# Patient Record
Sex: MALE | Race: WHITE | NOT HISPANIC OR LATINO | Employment: UNEMPLOYED | URBAN - NONMETROPOLITAN AREA
[De-identification: names, ages, dates, MRNs, and addresses within clinical notes are randomized per-mention and may not be internally consistent; named-entity substitution may affect disease eponyms.]

---

## 2017-04-14 ENCOUNTER — HOSPITAL ENCOUNTER (EMERGENCY)
Facility: HOSPITAL | Age: 57
Discharge: HOME | End: 2017-04-14
Attending: FAMILY MEDICINE | Admitting: FAMILY MEDICINE

## 2017-04-14 VITALS
HEIGHT: 72 IN | OXYGEN SATURATION: 95 % | HEART RATE: 57 BPM | SYSTOLIC BLOOD PRESSURE: 136 MMHG | DIASTOLIC BLOOD PRESSURE: 93 MMHG | TEMPERATURE: 98.2 F | WEIGHT: 220 LBS | RESPIRATION RATE: 18 BRPM | BODY MASS INDEX: 29.8 KG/M2

## 2017-04-14 DIAGNOSIS — S71.111A: Primary | ICD-10-CM

## 2017-04-14 PROCEDURE — 99283 EMERGENCY DEPT VISIT LOW MDM: CPT

## 2017-04-14 RX ORDER — CLOPIDOGREL BISULFATE 75 MG/1
40 TABLET ORAL DAILY
COMMUNITY

## 2017-04-14 NOTE — ED PROVIDER NOTES
Subjective   Patient is a 56 y.o. male presenting with skin laceration.   History provided by:  Patient   used: No    Laceration   Location:  Leg  Leg laceration location:  R upper leg  Time since incident:  30 minutes  Laceration mechanism:  Knife  Pain details:     Quality:  Sharp and burning    Severity:  Severe    Timing:  Constant    Progression:  Worsening  Relieved by:  Nothing  Worsened by:  Nothing  Ineffective treatments:  None tried  Tetanus status: within the last 2 years.  Associated symptoms: no fever, no focal weakness, no numbness, no rash, no redness and no swelling    Patient states 30 minutes ago he was cutting a tie and accidentally cut his right thigh.  He applied pressure and dressed the wound and came to the ED.  Pain is 7/10 and worsening currently.      Review of Systems   Constitutional: Negative for fever.   Skin: Negative for rash.   Neurological: Negative for focal weakness.       No past medical history on file.    Allergies   Allergen Reactions   • Codeine    • Statins        No past surgical history on file.    No family history on file.    Social History     Social History   • Marital status:      Spouse name: N/A   • Number of children: N/A   • Years of education: N/A     Social History Main Topics   • Smoking status: Not on file   • Smokeless tobacco: Not on file   • Alcohol use Not on file   • Drug use: Not on file   • Sexual activity: Not on file     Other Topics Concern   • Not on file     Social History Narrative           Objective   Physical Exam   Constitutional: He is oriented to person, place, and time. He appears well-developed and well-nourished. No distress.   Cardiovascular: Normal rate, regular rhythm, normal heart sounds and intact distal pulses.  Exam reveals no gallop and no friction rub.    No murmur heard.  Pulmonary/Chest: Effort normal and breath sounds normal.   Musculoskeletal:        Legs:  Neurological: He is alert and oriented  to person, place, and time.   Skin: He is not diaphoretic.       Laceration Repair  Date/Time: 4/15/2017 5:00 PM  Performed by: ANA CURRY  Authorized by: GARLAND KHALIL   Consent: Verbal consent obtained.  Risks and benefits: risks, benefits and alternatives were discussed  Consent given by: patient  Patient understanding: patient states understanding of the procedure being performed  Patient consent: the patient's understanding of the procedure matches consent given  Procedure consent: procedure consent matches procedure scheduled  Relevant documents: relevant documents present and verified  Test results: test results available and properly labeled  Patient identity confirmed: verbally with patient  Body area: lower extremity  Location details: right upper leg  Laceration length: 4 cm  Foreign bodies: no foreign bodies  Tendon involvement: none  Nerve involvement: none  Vascular damage: no  Anesthesia: local infiltration    Anesthesia:  Anesthesia: local infiltration  Local Anesthetic: lidocaine 1% without epinephrine   Anesthetic total: 5 mL  Sedation:  Patient sedated: no    Preparation: Patient was prepped and draped in the usual sterile fashion.  Irrigation solution: saline  Irrigation method: syringe  Amount of cleaning: standard  Debridement: none  Degree of undermining: none  Skin closure: 4-0 nylon  Subcutaneous closure: 5-0 Vicryl  Number of sutures: 6  Technique: simple  Approximation: close  Approximation difficulty: simple  Dressing: 4x4 sterile gauze  Patient tolerance: Patient tolerated the procedure well with no immediate complications               ED Course  ED Course   Comment By Time   Patient seen and evaluated with Dr. Khalil.  4cm bleeding laceration.  6 stitches placed. Ana Curry MD 04/14 1059                  MDM    Final diagnoses:   Laceration of thigh, right, initial encounter             This document has been electronically signed by Ana Mayer  MD Patricio on April 15, 2017 5:06 PM         Karen Curry MD  Resident  04/14/17 1104       Karen Curry MD  Resident  04/15/17 5643

## 2017-04-14 NOTE — ED NOTES
Patient on other meds but does not have list or remember all and/or dosages.     Evie Diana LPN  04/14/17 0950